# Patient Record
Sex: FEMALE | Race: WHITE | Employment: FULL TIME | URBAN - METROPOLITAN AREA
[De-identification: names, ages, dates, MRNs, and addresses within clinical notes are randomized per-mention and may not be internally consistent; named-entity substitution may affect disease eponyms.]

---

## 2018-04-20 ENCOUNTER — OFFICE VISIT (OUTPATIENT)
Dept: ORTHOPEDIC SURGERY | Age: 53
End: 2018-04-20

## 2018-04-20 VITALS
DIASTOLIC BLOOD PRESSURE: 72 MMHG | HEIGHT: 63 IN | WEIGHT: 170 LBS | HEART RATE: 79 BPM | SYSTOLIC BLOOD PRESSURE: 107 MMHG | BODY MASS INDEX: 30.12 KG/M2

## 2018-04-20 DIAGNOSIS — M17.12 PRIMARY OSTEOARTHRITIS OF LEFT KNEE: ICD-10-CM

## 2018-04-20 DIAGNOSIS — M25.562 LEFT KNEE PAIN, UNSPECIFIED CHRONICITY: Primary | ICD-10-CM

## 2018-04-20 PROCEDURE — 99213 OFFICE O/P EST LOW 20 MIN: CPT | Performed by: ORTHOPAEDIC SURGERY

## 2018-04-24 ENCOUNTER — HOSPITAL ENCOUNTER (OUTPATIENT)
Dept: PHYSICAL THERAPY | Age: 53
Discharge: HOME | End: 2018-04-30
Admitting: ORTHOPAEDIC SURGERY

## 2018-05-01 ENCOUNTER — HOSPITAL ENCOUNTER (OUTPATIENT)
Dept: PHYSICAL THERAPY | Age: 53
Discharge: HOME | End: 2018-05-31
Attending: ORTHOPAEDIC SURGERY | Admitting: ORTHOPAEDIC SURGERY

## 2018-05-03 ENCOUNTER — HOSPITAL ENCOUNTER (OUTPATIENT)
Dept: PHYSICAL THERAPY | Age: 53
Discharge: HOME | End: 2018-05-04
Admitting: ORTHOPAEDIC SURGERY

## 2018-05-08 ENCOUNTER — HOSPITAL ENCOUNTER (OUTPATIENT)
Dept: PHYSICAL THERAPY | Age: 53
Discharge: HOME | End: 2018-05-09
Admitting: ORTHOPAEDIC SURGERY

## 2018-05-11 ENCOUNTER — HOSPITAL ENCOUNTER (OUTPATIENT)
Dept: PHYSICAL THERAPY | Age: 53
Discharge: HOME | End: 2018-05-12
Admitting: ORTHOPAEDIC SURGERY

## 2018-06-01 ENCOUNTER — HOSPITAL ENCOUNTER (OUTPATIENT)
Dept: PHYSICAL THERAPY | Age: 53
Discharge: HOME | End: 2018-06-30
Attending: ORTHOPAEDIC SURGERY | Admitting: ORTHOPAEDIC SURGERY

## 2022-07-16 ENCOUNTER — APPOINTMENT (OUTPATIENT)
Dept: GENERAL RADIOLOGY | Age: 57
End: 2022-07-16
Payer: COMMERCIAL

## 2022-07-16 ENCOUNTER — HOSPITAL ENCOUNTER (EMERGENCY)
Age: 57
Discharge: HOME | End: 2022-07-17
Attending: STUDENT IN AN ORGANIZED HEALTH CARE EDUCATION/TRAINING PROGRAM
Payer: COMMERCIAL

## 2022-07-16 VITALS
RESPIRATION RATE: 18 BRPM | OXYGEN SATURATION: 97 % | TEMPERATURE: 98.4 F | HEART RATE: 78 BPM | DIASTOLIC BLOOD PRESSURE: 78 MMHG | SYSTOLIC BLOOD PRESSURE: 113 MMHG

## 2022-07-16 DIAGNOSIS — S93.402A SPRAIN OF LEFT ANKLE, UNSPECIFIED LIGAMENT, INITIAL ENCOUNTER: Primary | ICD-10-CM

## 2022-07-16 PROCEDURE — 73610 X-RAY EXAM OF ANKLE: CPT

## 2022-07-16 PROCEDURE — 6370000000 HC RX 637 (ALT 250 FOR IP): Performed by: STUDENT IN AN ORGANIZED HEALTH CARE EDUCATION/TRAINING PROGRAM

## 2022-07-16 PROCEDURE — 6360000002 HC RX W HCPCS: Performed by: STUDENT IN AN ORGANIZED HEALTH CARE EDUCATION/TRAINING PROGRAM

## 2022-07-16 PROCEDURE — 96372 THER/PROPH/DIAG INJ SC/IM: CPT

## 2022-07-16 PROCEDURE — 73630 X-RAY EXAM OF FOOT: CPT

## 2022-07-16 PROCEDURE — 99284 EMERGENCY DEPT VISIT MOD MDM: CPT

## 2022-07-16 PROCEDURE — 73590 X-RAY EXAM OF LOWER LEG: CPT

## 2022-07-16 RX ORDER — KETOROLAC TROMETHAMINE 30 MG/ML
15 INJECTION, SOLUTION INTRAMUSCULAR; INTRAVENOUS ONCE
Status: COMPLETED | OUTPATIENT
Start: 2022-07-16 | End: 2022-07-16

## 2022-07-16 RX ORDER — ACETAMINOPHEN 500 MG
1000 TABLET ORAL ONCE
Status: COMPLETED | OUTPATIENT
Start: 2022-07-16 | End: 2022-07-16

## 2022-07-16 RX ADMIN — ACETAMINOPHEN 1000 MG: 500 TABLET ORAL at 23:06

## 2022-07-16 RX ADMIN — KETOROLAC TROMETHAMINE 15 MG: 30 INJECTION, SOLUTION INTRAMUSCULAR at 23:06

## 2022-07-17 NOTE — ED PROVIDER NOTES
ED Attending Attestation Note     Date of evaluation: 7/16/2022    This patient was seen by the resident. I have seen and examined the patient, agree with the workup, evaluation, management and diagnosis. The care plan has been discussed. My assessment reveals a well-appearing woman with a mechanical injury to her L ankle. She has had pain laterally in the L ankle since the injury earlier today. No fall, no head injury, not anticoagulated. On exam:  Eyes: Sclera clear, pupils equally round    Pulm: Symmetric chest wall excursion, no increased WOB  Cor: Regular rhythm, normal rate   Extr: warm, well perfused  Neuro:  Awake, alert, conversant. Speech is clear and fluent, with no aphasia or dysarthria. Focused exam LLE   The foot has intact sensation to light touch in the first dorsal intertriginous space, as well as the dorsal lateral, plantar medial, and plantar lateral surfaces. There is intact cap refill. Intact flexion and extension of the hallux and plantar/dorsiflexion of the ankle with full strength. The dorsalis pedis and posterior tibialis pulses are intact and symmetric to the contralateral side. There is no overlying warmth, erythema  There is TTP at the lateral malleolus  There is diffuse edema laterally around the malleolus    There is no pain with palpation of the midfoot or forefoot, and there is no overlying erythema, warmth, or ecchymosis.   The patient does not have significant pain with plantar flexion against resistance  he Achilles tendon is non tender to palpation    Plan for Xrays  Doubt septic arhtirtisy or crystal arthropathy or DVT     Brittany Welch MD  07/17/22 [id number]

## 2022-07-17 NOTE — DISCHARGE INSTRUCTIONS
Take naproxen twice per day every day with food for the next 3-5 days, then as stated for continued pain and swelling. Rest, ice, and elevate the foot as much as possible for the next 48 hours, to minimize swelling. You should wear the Ace wrap or air cast for support and comfort. Ensure that the wrap is snug, but not so tight that it causes any numbness, tingling, or color changes in your toes. You should use the crutches and keep your weight off the injured foot completely for the next day or two, then gradually begin increasing weightbearing as discussed, with a goal to getting off of the crutches within 1-2 weeks. Please call your doctor's office, or return to the emergency department, if you have increasing pain, increasing swelling, or other concerning symptoms. Rest:  Rest and protect the injured area. If it hurts to bear weight on the injury, use crutches, if it hurts to move the area immobilize it with a splint. Ice:  Apply ice or a frozen object, such as a bag of corn, etc from the freezer, to the injury. The cold will reduce swelling and pain at the injured site. This step should be done as soon as possible. Apply the frozen object to the area for 20 minutes 5-6 times a day for the first 48 hours. Do not use heat in the first 48 hours after an injury    Compression:  Compress the injured site by applying an Ace bandage. This will decrease swelling of the injured region. Although the wrap should be snug, make sure it is not too tight as this can cause numbness, tingling, or increased pain. Elevation:  Elevate the foot/ankle above the level of your heart (a few pillows) when at rest to reduce pain and swelling.

## 2022-07-17 NOTE — ED TRIAGE NOTES
Mechanical fall on curb today, injury to left foot. Pt c/o severe pain, unable to move foot side to side.  Strong pedal pulses palpated

## 2022-07-21 ASSESSMENT — ENCOUNTER SYMPTOMS
SORE THROAT: 0
COUGH: 0
ABDOMINAL PAIN: 0
BACK PAIN: 0
COLOR CHANGE: 0
NAUSEA: 0
EYE REDNESS: 0
BLOOD IN STOOL: 0
SHORTNESS OF BREATH: 0
CONSTIPATION: 0
RHINORRHEA: 0
DIARRHEA: 0
VOMITING: 0
EYE DISCHARGE: 0

## 2022-07-21 NOTE — ED PROVIDER NOTES
[id number] RESIDENT NOTE       Date of evaluation: 7/16/2022    Chief Complaint     Foot Injury (Mechanical fall on curb today, injury to left foot. Pt c/o severe pain, unable to move foot side to side. Strong pedal pulses palpated )      of Present Illness     Carly Hector is a 62 y.o. female who presents left foot and ankle pain after fall. Patient was walking quickly through a parking lot when she caught her right foot on a curb and then struck her left foot on the curb. She did fall during this. She denies any other injuries. Reports left foot swelling and left ankle swelling and pain since the incident. She has been able to ambulate with some difficulty. Denies any sensory changes. No bleeding disorders. No previous surgeries on that left ankle. Review of Systems     Review of Systems   Constitutional:  Negative for chills, fatigue and fever. HENT:  Negative for congestion, rhinorrhea and sore throat. Eyes:  Negative for discharge, redness and visual disturbance. Respiratory:  Negative for cough and shortness of breath. Cardiovascular:  Negative for chest pain. Gastrointestinal:  Negative for abdominal pain, blood in stool, constipation, diarrhea, nausea and vomiting. Genitourinary:  Negative for difficulty urinating, dysuria and hematuria. Musculoskeletal:  Positive for arthralgias, gait problem, joint swelling and myalgias. Negative for back pain and neck pain. Skin:  Negative for color change and rash. Neurological:  Negative for weakness, light-headedness and headaches. Hematological:  Does not bruise/bleed easily. Psychiatric/Behavioral:  Negative for confusion. Past Medical, Surgical, Family, and Social History     She has a past medical history of GERD (gastroesophageal reflux disease), Hyperlipidemia, Migraines, and Rheumatoid arthritis(714.0).   She has a past surgical history that includes knee surgery. Her family history includes Arthritis in her father; Cancer (age of onset: 61) in her mother; Diabetes in her maternal grandmother; Diabetes (age of onset: 61) in her mother. She reports that she quit smoking about 17 years ago. She has a 5.00 pack-year smoking history. She has never used smokeless tobacco. She reports current alcohol use. She reports that she does not use drugs. Medications     Discharge Medication List as of 7/17/2022 12:42 AM        CONTINUE these medications which have NOT CHANGED    Details   diclofenac (VOLTAREN) 75 MG EC tablet Take 1 tablet by mouth 2 times daily for 30 days. , Disp-60 tablet, R-2      clindamycin (CLEOCIN) 300 MG capsule Take 300 mg by mouth 3 times daily. !! naproxen (NAPROSYN) 500 MG tablet Take 500 mg by mouth as needed. !! naproxen (NAPROSYN) 500 MG tablet Take 1 tablet by mouth 2 times daily. , Disp-60 tablet, R-0      ergocalciferol (DRISDOL) 37671 UNITS capsule Take 1 capsule by mouth once a week for 8 doses. , Disp-8 capsule, R-0      folic acid (FOLVITE) 467 MCG tablet Take 400 mcg by mouth daily. vitamin B-12 (CYANOCOBALAMIN) 1000 MCG tablet Take 1,000 mcg by mouth daily. Cholecalciferol (VITAMIN D) 2000 UNITS TABS Take 1 tablet by mouth daily. hydroxychloroquine (PLAQUENIL) 200 MG tablet Take 1 tablet by mouth daily. , Disp-30 tablet, R-2      esomeprazole (NEXIUM) 40 MG capsule Take 1 capsule by mouth every morning (before breakfast). , Disp-30 capsule, R-11      frovatriptan succinate (FROVA) 2.5 MG TABS Take 1 tablet by mouth as needed (migraine). , Disp-12 tablet, R-11      methotrexate 2.5 MG tablet Take 7 tablets by mouth once a week., Disp-28 tablet, R-2       !! - Potential duplicate medications found. Please discuss with provider. Allergies     She is allergic to pcn [penicillins].     Physical Exam     INITIAL VITALS: BP: 113/78, Temp: 98.4 °F (36.9 °C), Heart Rate: 78, Resp: 18, SpO2: 97 %   Physical Exam  Vitals and nursing note reviewed. Exam conducted with a chaperone present. Constitutional:       General: She is not in acute distress. Appearance: Normal appearance. She is not ill-appearing or toxic-appearing. HENT:      Head: Normocephalic and atraumatic. Right Ear: External ear normal.      Left Ear: External ear normal.      Nose: Nose normal. No congestion or rhinorrhea. Mouth/Throat:      Mouth: Mucous membranes are moist.      Pharynx: Oropharynx is clear. No oropharyngeal exudate or posterior oropharyngeal erythema. Eyes:      General: No scleral icterus. Right eye: No discharge. Left eye: No discharge. Extraocular Movements: Extraocular movements intact. Conjunctiva/sclera: Conjunctivae normal.      Pupils: Pupils are equal, round, and reactive to light. Cardiovascular:      Rate and Rhythm: Normal rate and regular rhythm. Pulses: Normal pulses. Heart sounds: Normal heart sounds. No murmur heard. No friction rub. No gallop. Pulmonary:      Effort: Pulmonary effort is normal. No respiratory distress. Breath sounds: Normal breath sounds. No wheezing, rhonchi or rales. Abdominal:      General: Abdomen is flat. Palpations: Abdomen is soft. Tenderness: There is no abdominal tenderness. There is no guarding or rebound. Musculoskeletal:      Cervical back: Normal range of motion and neck supple. Comments: Left foot with swelling medicine tenderness throughout, no bruising, left ankle with mild swelling, no point bony tenderness, full active range of motion present, neurovascularly intact distally with 2+ DP pulse and sensation and motor intact. Skin:     General: Skin is warm and dry. Neurological:      General: No focal deficit present. Mental Status: She is alert and oriented to person, place, and time.    Psychiatric:         Mood and Affect: Mood normal.         Behavior: Behavior normal. DiagnosticResults         RADIOLOGY:  XR FOOT LEFT (MIN 3 VIEWS)   Final Result      1. No findings for acute bony or soft tissue abnormality within the left foot. 2 VIEWS OF THE LEFT TIBIA AND FIBULA      HISTORY: Pain and swelling      FINDINGS: No fracture or dislocation. Visualized knee and ankle joints are well-maintained. No discrete soft tissue abnormality identified. No radiopaque foreign body seen. IMPRESSION:      1. No findings for acute bony or soft tissue abnormality. XR ANKLE LEFT (MIN 3 VIEWS)   Final Result      1. Soft tissue swelling in the ankle without underlying acute bony abnormality. Correlate clinically. XR TIBIA FIBULA LEFT (2 VIEWS)   Final Result      1. No findings for acute bony or soft tissue abnormality within the left foot. 2 VIEWS OF THE LEFT TIBIA AND FIBULA      HISTORY: Pain and swelling      FINDINGS: No fracture or dislocation. Visualized knee and ankle joints are well-maintained. No discrete soft tissue abnormality identified. No radiopaque foreign body seen. IMPRESSION:      1. No findings for acute bony or soft tissue abnormality. LABS:   No results found for this visit on 07/16/22. ED BEDSIDE ULTRASOUND:  No results found. RECENT VITALS:  BP: 113/78, Temp: 98.4 °F (36.9 °C), Heart Rate: 78,Resp: 18, SpO2: 97 %     Procedures     None    ED Course     Nursing Notes, Past Medical Hx, Past Surgical Hx, Social Hx, Allergies, and Family Hx were reviewed. The patient was given the followingmedications:  Orders Placed This Encounter   Medications    acetaminophen (TYLENOL) tablet 1,000 mg    ketorolac (TORADOL) injection 15 mg       CONSULTS:  None    MEDICAL DECISION MAKING / ASSESSMENT / Crispin Brunner is a 62 y.o. female with left ankle pain and swelling. We will get XR foot and XR ankle and XR tib-fib.   Does have swelling over the dorsum of the foot without any bruising or bony tenderness. Will give Toradol and Tylenol for pain. Neurovascularly intact distally. No obvious deformity. XR with no evidence of bony abnormalities. Patient feels improved after treatment. Will Ace wrap ankle and give crutches. Will give follow-up with orthopedic surgery. Patient is safe for discharge. Return precautions discussed. This patient was also evaluated by the attending physician. All care plans werediscussed and agreed upon. Clinical Impression     1.  Sprain of left ankle, unspecified ligament, initial encounter        Disposition     PATIENT REFERRED TO:  Lorenzo Martinez MD  [facility] [address]  [phone]    Schedule an appointment as soon as possible for a visit in 3 days        DISCHARGE MEDICATIONS:  Discharge Medication List as of 7/17/2022 12:42 AM          DISPOSITION Decision To Discharge 07/17/2022 12:37:52 AM      Cristian Clemente MD  Resident  07/21/22 [id number]